# Patient Record
Sex: FEMALE | Race: WHITE | NOT HISPANIC OR LATINO | Employment: UNEMPLOYED | URBAN - METROPOLITAN AREA
[De-identification: names, ages, dates, MRNs, and addresses within clinical notes are randomized per-mention and may not be internally consistent; named-entity substitution may affect disease eponyms.]

---

## 2023-03-18 LAB
COMPLEMENT C3 (MG/DL) IN SER/PLAS: 112 MG/DL (ref 87–200)
COMPLEMENT C4 (MG/DL) IN SER/PLAS: 32 MG/DL (ref 10–50)
CREATININE (MG/DL) IN URINE: 120 MG/DL (ref 20–320)
PROTEIN (MG/DL) IN URINE: 20 MG/DL (ref 5–24)
PROTEIN/CREATININE (MG/MG) IN URINE: 0.17 MG/MG CREAT (ref 0–0.17)

## 2023-03-21 LAB
ALANINE AMINOTRANSFERASE (SGPT) (U/L) IN SER/PLAS: 16 U/L (ref 7–45)
ALBUMIN (G/DL) IN SER/PLAS: 4.3 G/DL (ref 3.4–5)
ALKALINE PHOSPHATASE (U/L) IN SER/PLAS: 67 U/L (ref 33–110)
ANION GAP IN SER/PLAS: 12 MMOL/L (ref 10–20)
ASPARTATE AMINOTRANSFERASE (SGOT) (U/L) IN SER/PLAS: 24 U/L (ref 9–39)
BASOPHILS (10*3/UL) IN BLOOD BY AUTOMATED COUNT: 0.06 X10E9/L (ref 0–0.1)
BASOPHILS/100 LEUKOCYTES IN BLOOD BY AUTOMATED COUNT: 1.1 % (ref 0–2)
BILIRUBIN TOTAL (MG/DL) IN SER/PLAS: 0.6 MG/DL (ref 0–1.2)
CALCIUM (MG/DL) IN SER/PLAS: 9.7 MG/DL (ref 8.6–10.6)
CARBON DIOXIDE, TOTAL (MMOL/L) IN SER/PLAS: 29 MMOL/L (ref 21–32)
CHLORIDE (MMOL/L) IN SER/PLAS: 104 MMOL/L (ref 98–107)
CREATININE (MG/DL) IN SER/PLAS: 0.88 MG/DL (ref 0.5–1.05)
EOSINOPHILS (10*3/UL) IN BLOOD BY AUTOMATED COUNT: 0.18 X10E9/L (ref 0–0.7)
EOSINOPHILS/100 LEUKOCYTES IN BLOOD BY AUTOMATED COUNT: 3.3 % (ref 0–6)
ERYTHROCYTE DISTRIBUTION WIDTH (RATIO) BY AUTOMATED COUNT: 12.2 % (ref 11.5–14.5)
ERYTHROCYTE MEAN CORPUSCULAR HEMOGLOBIN CONCENTRATION (G/DL) BY AUTOMATED: 31.5 G/DL (ref 32–36)
ERYTHROCYTE MEAN CORPUSCULAR VOLUME (FL) BY AUTOMATED COUNT: 99 FL (ref 80–100)
ERYTHROCYTES (10*6/UL) IN BLOOD BY AUTOMATED COUNT: 4.2 X10E12/L (ref 4–5.2)
GFR FEMALE: 77 ML/MIN/1.73M2
GLUCOSE (MG/DL) IN SER/PLAS: 91 MG/DL (ref 74–99)
HEMATOCRIT (%) IN BLOOD BY AUTOMATED COUNT: 41.6 % (ref 36–46)
HEMOGLOBIN (G/DL) IN BLOOD: 13.1 G/DL (ref 12–16)
IMMATURE GRANULOCYTES/100 LEUKOCYTES IN BLOOD BY AUTOMATED COUNT: 0.2 % (ref 0–0.9)
LEUKOCYTES (10*3/UL) IN BLOOD BY AUTOMATED COUNT: 5.4 X10E9/L (ref 4.4–11.3)
LYMPHOCYTES (10*3/UL) IN BLOOD BY AUTOMATED COUNT: 1.51 X10E9/L (ref 1.2–4.8)
LYMPHOCYTES/100 LEUKOCYTES IN BLOOD BY AUTOMATED COUNT: 27.9 % (ref 13–44)
MONOCYTES (10*3/UL) IN BLOOD BY AUTOMATED COUNT: 0.66 X10E9/L (ref 0.1–1)
MONOCYTES/100 LEUKOCYTES IN BLOOD BY AUTOMATED COUNT: 12.2 % (ref 2–10)
NEUTROPHILS (10*3/UL) IN BLOOD BY AUTOMATED COUNT: 2.99 X10E9/L (ref 1.2–7.7)
NEUTROPHILS/100 LEUKOCYTES IN BLOOD BY AUTOMATED COUNT: 55.3 % (ref 40–80)
NRBC (PER 100 WBCS) BY AUTOMATED COUNT: 0 /100 WBC (ref 0–0)
PLATELETS (10*3/UL) IN BLOOD AUTOMATED COUNT: 236 X10E9/L (ref 150–450)
POTASSIUM (MMOL/L) IN SER/PLAS: 4.8 MMOL/L (ref 3.5–5.3)
PROTEIN TOTAL: 7.1 G/DL (ref 6.4–8.2)
SODIUM (MMOL/L) IN SER/PLAS: 140 MMOL/L (ref 136–145)
THYROTROPIN (MIU/L) IN SER/PLAS BY DETECTION LIMIT <= 0.05 MIU/L: 0.56 MIU/L (ref 0.44–3.98)
UREA NITROGEN (MG/DL) IN SER/PLAS: 12 MG/DL (ref 6–23)

## 2023-06-15 LAB
CHLAMYDIA TRACH., AMPLIFIED: NEGATIVE
N. GONORRHEA, AMPLIFIED: NEGATIVE
TRICHOMONAS VAGINALIS: NEGATIVE

## 2023-08-03 LAB
APPEARANCE, URINE: ABNORMAL
BILIRUBIN, URINE: NEGATIVE
BLOOD, URINE: ABNORMAL
COLOR, URINE: YELLOW
GLUCOSE, URINE: NEGATIVE MG/DL
KETONES, URINE: NEGATIVE MG/DL
LEUKOCYTE ESTERASE, URINE: ABNORMAL
MUCUS, URINE: ABNORMAL /LPF
NITRITE, URINE: NEGATIVE
PH, URINE: 6 (ref 5–8)
PROTEIN, URINE: ABNORMAL MG/DL
RBC, URINE: 24 /HPF (ref 0–5)
SPECIFIC GRAVITY, URINE: 1.01 (ref 1–1.03)
SQUAMOUS EPITHELIAL CELLS, URINE: <1 /HPF
UROBILINOGEN, URINE: <2 MG/DL (ref 0–1.9)
WBC CLUMPS, URINE: ABNORMAL /HPF
WBC, URINE: 184 /HPF (ref 0–5)

## 2023-08-04 LAB — URINE CULTURE: NORMAL

## 2024-01-02 ENCOUNTER — TELEPHONE (OUTPATIENT)
Dept: PRIMARY CARE | Facility: CLINIC | Age: 59
End: 2024-01-02

## 2024-01-02 ENCOUNTER — OFFICE VISIT (OUTPATIENT)
Dept: PRIMARY CARE | Facility: CLINIC | Age: 59
End: 2024-01-02
Payer: COMMERCIAL

## 2024-01-02 VITALS
WEIGHT: 115 LBS | HEIGHT: 65 IN | SYSTOLIC BLOOD PRESSURE: 130 MMHG | BODY MASS INDEX: 19.16 KG/M2 | DIASTOLIC BLOOD PRESSURE: 80 MMHG | HEART RATE: 80 BPM

## 2024-01-02 DIAGNOSIS — H61.22 IMPACTED CERUMEN OF LEFT EAR: ICD-10-CM

## 2024-01-02 DIAGNOSIS — H66.90 ACUTE OTITIS MEDIA, UNSPECIFIED OTITIS MEDIA TYPE: ICD-10-CM

## 2024-01-02 DIAGNOSIS — R42 VERTIGO: Primary | ICD-10-CM

## 2024-01-02 DIAGNOSIS — H93.12 BUZZING IN EAR, LEFT: ICD-10-CM

## 2024-01-02 PROCEDURE — 99213 OFFICE O/P EST LOW 20 MIN: CPT | Performed by: INTERNAL MEDICINE

## 2024-01-02 PROCEDURE — 1036F TOBACCO NON-USER: CPT | Performed by: INTERNAL MEDICINE

## 2024-01-02 PROCEDURE — 69210 REMOVE IMPACTED EAR WAX UNI: CPT | Performed by: INTERNAL MEDICINE

## 2024-01-02 RX ORDER — AZITHROMYCIN 250 MG/1
TABLET, FILM COATED ORAL
Qty: 6 TABLET | Refills: 0 | Status: SHIPPED | OUTPATIENT
Start: 2024-01-02 | End: 2024-01-07

## 2024-01-02 RX ORDER — MECLIZINE HYDROCHLORIDE 25 MG/1
12.5 TABLET ORAL 3 TIMES DAILY PRN
Qty: 20 TABLET | Refills: 0 | Status: SHIPPED | OUTPATIENT
Start: 2024-01-02 | End: 2024-01-09

## 2024-01-02 RX ORDER — ROSUVASTATIN CALCIUM 20 MG/1
20 TABLET, COATED ORAL DAILY
COMMUNITY
End: 2024-01-12 | Stop reason: SDUPTHER

## 2024-01-02 RX ORDER — LEVOTHYROXINE SODIUM 88 UG/1
88 TABLET ORAL DAILY
COMMUNITY
End: 2024-01-12 | Stop reason: SDUPTHER

## 2024-01-02 NOTE — PROGRESS NOTES
"Subjective   Patient ID: Alesia Arias is a 58 y.o. female who presents for Vertigo.    HPI   Patient is a 58-year-old  female who is being seen today for a sick visit (I am covering for Dr. Wilson who is her regular PCP and is on medication currently)  Patient complains of 5 days history of a room spinning sensation associated with nausea and dizziness along with a buzzing sensation in left ear with decreased hearing in this ear as well.  Patient denies fever, chills, chest pain, shortness of breath, palpitations, syncope, headaches or genitourinary symptoms.  Review of Systems  As per HPI  Objective   /88 (BP Location: Right arm, Patient Position: Sitting, BP Cuff Size: Adult)   Pulse 80   Ht 1.651 m (5' 5\")   Wt 52.2 kg (115 lb)   BMI 19.14 kg/m²     Physical Exam  General - well developed, well appearing, thin, middle-aged  female in no acute respiratory distress  Eyes - normal sclera and conjunctiva with no pallor or icterus, normal extraocular movements  ENT -right external auditory canal and right tympanic membrane are normal, left external auditory canal is impacted with cerumen, after removal of the cerumen inspection of the left tympanic membrane revealed slight erythema and dullness  Neck - No JVD, thyromegaly or lymphadenopathy  Lungs - no respiratory distress and lungs clear to auscultation bilaterally  Heart - normal S1, S2 with normal heart rate, rhythm and no murmurs   Abdomen - soft, nontender with no masses or organomegaly  Extremities - no cyanosis or pedal edema  Neuro - grossly normal neuro exam with no focal neuro deficits  Psych - normal mental status, mood and affect   Skin - no rashes or ulcers  MSK - normal gait with grossly normal ROM of major joints  Assessment/Plan     1.  Vertigo-meclizine 12.5 mg 3 times daily as needed has been prescribed, patient has been warned regarding side effect of sedation and to not drive if she is drowsy due to the " medication  2.  Buzzing sensation in left ear with cerumen impaction-ear irrigation with removal of cerumen performed   3.  Left acute otitis media-Z-Chandra for 5 days prescribed  Follow-up with regular MD as advised during previous appointment.  20 minutes spent rooming the patient, reviewing records, eliciting history, examining patient, counseling, coordination of care and in documentation.  This note was partially generated using the Dragon voice recognition system. There may be some incorrect words, spelling and punctuation errors that were not corrected prior to committing the note to the patient's medical record.

## 2024-01-02 NOTE — TELEPHONE ENCOUNTER
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  [facility]. Left side ear feels like she is under water come slight congestion.  But when she stands up or turns she is very dizzy or unsteady.      She wanted to know if she could see you today in Dr. iWlson's absence?  She goes back to work tomorrow    [phone]

## 2024-01-12 DIAGNOSIS — E03.9 HYPOTHYROIDISM, UNSPECIFIED TYPE: ICD-10-CM

## 2024-01-12 DIAGNOSIS — E78.5 HYPERLIPIDEMIA, UNSPECIFIED HYPERLIPIDEMIA TYPE: Primary | ICD-10-CM

## 2024-01-12 RX ORDER — ROSUVASTATIN CALCIUM 20 MG/1
20 TABLET, COATED ORAL DAILY
Qty: 30 TABLET | Refills: 0 | Status: SHIPPED | OUTPATIENT
Start: 2024-01-12 | End: 2024-02-16 | Stop reason: SDUPTHER

## 2024-01-12 RX ORDER — LEVOTHYROXINE SODIUM 88 UG/1
88 TABLET ORAL DAILY
Qty: 60 TABLET | Refills: 0 | Status: SHIPPED | OUTPATIENT
Start: 2024-01-12 | End: 2024-03-01 | Stop reason: SDUPTHER

## 2024-01-12 NOTE — TELEPHONE ENCOUNTER
[facility]; 90 day supply.  Pt can only take brand name synthroid.  Called Pt, told her to contact [employer] and [employer] to find out if synthroid is covered if she goes to local pharm [facility] doesn't carry it.  In the meantime Pt asks for 14 day supply of synthroid to be sent to local pharm.  LG

## 2024-02-16 DIAGNOSIS — E78.5 HYPERLIPIDEMIA, UNSPECIFIED HYPERLIPIDEMIA TYPE: ICD-10-CM

## 2024-02-16 RX ORDER — ROSUVASTATIN CALCIUM 20 MG/1
20 TABLET, COATED ORAL DAILY
Qty: 30 TABLET | Refills: 0 | Status: SHIPPED | OUTPATIENT
Start: 2024-02-16 | End: 2024-03-01 | Stop reason: SDUPTHER

## 2024-02-19 ENCOUNTER — TELEPHONE (OUTPATIENT)
Dept: PRIMARY CARE | Facility: CLINIC | Age: 59
End: 2024-02-19
Payer: COMMERCIAL

## 2024-02-19 DIAGNOSIS — E78.00 HYPERCHOLESTEROLEMIA: Primary | ICD-10-CM

## 2024-02-19 DIAGNOSIS — E03.9 HYPOTHYROIDISM, UNSPECIFIED TYPE: ICD-10-CM

## 2024-02-19 DIAGNOSIS — R79.9 ABNORMAL BLOOD CHEMISTRY: ICD-10-CM

## 2024-02-19 DIAGNOSIS — Z00.00 ROUTINE GENERAL MEDICAL EXAMINATION AT A HEALTH CARE FACILITY: ICD-10-CM

## 2024-02-19 NOTE — TELEPHONE ENCOUNTER
Patient has an appointment next week and she wanted to know if you could put an order in for her lab work so she can go this week to have it done.    [phone]

## 2024-02-21 ENCOUNTER — LAB (OUTPATIENT)
Dept: LAB | Facility: LAB | Age: 59
End: 2024-02-21
Payer: COMMERCIAL

## 2024-02-21 DIAGNOSIS — E03.9 HYPOTHYROIDISM, UNSPECIFIED TYPE: ICD-10-CM

## 2024-02-21 DIAGNOSIS — E78.00 HYPERCHOLESTEROLEMIA: ICD-10-CM

## 2024-02-21 DIAGNOSIS — R79.9 ABNORMAL BLOOD CHEMISTRY: ICD-10-CM

## 2024-02-21 LAB
ALBUMIN SERPL BCP-MCNC: 4.4 G/DL (ref 3.4–5)
ALP SERPL-CCNC: 50 U/L (ref 33–110)
ALT SERPL W P-5'-P-CCNC: 14 U/L (ref 7–45)
ANION GAP SERPL CALC-SCNC: 14 MMOL/L (ref 10–20)
AST SERPL W P-5'-P-CCNC: 18 U/L (ref 9–39)
BASOPHILS # BLD AUTO: 0.06 X10*3/UL (ref 0–0.1)
BASOPHILS NFR BLD AUTO: 1 %
BILIRUB SERPL-MCNC: 0.6 MG/DL (ref 0–1.2)
BUN SERPL-MCNC: 11 MG/DL (ref 6–23)
CALCIUM SERPL-MCNC: 9.3 MG/DL (ref 8.6–10.6)
CHLORIDE SERPL-SCNC: 107 MMOL/L (ref 98–107)
CHOLEST SERPL-MCNC: 205 MG/DL (ref 0–199)
CHOLESTEROL/HDL RATIO: 2.5
CO2 SERPL-SCNC: 25 MMOL/L (ref 21–32)
CREAT SERPL-MCNC: 1.01 MG/DL (ref 0.5–1.05)
EGFRCR SERPLBLD CKD-EPI 2021: 65 ML/MIN/1.73M*2
EOSINOPHIL # BLD AUTO: 0.12 X10*3/UL (ref 0–0.7)
EOSINOPHIL NFR BLD AUTO: 2.1 %
ERYTHROCYTE [DISTWIDTH] IN BLOOD BY AUTOMATED COUNT: 12 % (ref 11.5–14.5)
GLUCOSE SERPL-MCNC: 82 MG/DL (ref 74–99)
HCT VFR BLD AUTO: 39.9 % (ref 36–46)
HDLC SERPL-MCNC: 81.5 MG/DL
HGB BLD-MCNC: 13.1 G/DL (ref 12–16)
IMM GRANULOCYTES # BLD AUTO: 0.01 X10*3/UL (ref 0–0.7)
IMM GRANULOCYTES NFR BLD AUTO: 0.2 % (ref 0–0.9)
LDLC SERPL CALC-MCNC: 107 MG/DL
LYMPHOCYTES # BLD AUTO: 1.74 X10*3/UL (ref 1.2–4.8)
LYMPHOCYTES NFR BLD AUTO: 30.4 %
MCH RBC QN AUTO: 31.6 PG (ref 26–34)
MCHC RBC AUTO-ENTMCNC: 32.8 G/DL (ref 32–36)
MCV RBC AUTO: 96 FL (ref 80–100)
MONOCYTES # BLD AUTO: 0.63 X10*3/UL (ref 0.1–1)
MONOCYTES NFR BLD AUTO: 11 %
NEUTROPHILS # BLD AUTO: 3.17 X10*3/UL (ref 1.2–7.7)
NEUTROPHILS NFR BLD AUTO: 55.3 %
NON HDL CHOLESTEROL: 124 MG/DL (ref 0–149)
NRBC BLD-RTO: 0 /100 WBCS (ref 0–0)
PLATELET # BLD AUTO: 239 X10*3/UL (ref 150–450)
POTASSIUM SERPL-SCNC: 4.2 MMOL/L (ref 3.5–5.3)
PROT SERPL-MCNC: 7.1 G/DL (ref 6.4–8.2)
RBC # BLD AUTO: 4.14 X10*6/UL (ref 4–5.2)
SODIUM SERPL-SCNC: 142 MMOL/L (ref 136–145)
TRIGL SERPL-MCNC: 83 MG/DL (ref 0–149)
TSH SERPL-ACNC: 3.03 MIU/L (ref 0.44–3.98)
VLDL: 17 MG/DL (ref 0–40)
WBC # BLD AUTO: 5.7 X10*3/UL (ref 4.4–11.3)

## 2024-02-21 PROCEDURE — 85025 COMPLETE CBC W/AUTO DIFF WBC: CPT

## 2024-02-21 PROCEDURE — 80061 LIPID PANEL: CPT

## 2024-02-21 PROCEDURE — 80053 COMPREHEN METABOLIC PANEL: CPT

## 2024-02-21 PROCEDURE — 84443 ASSAY THYROID STIM HORMONE: CPT

## 2024-02-21 PROCEDURE — 36415 COLL VENOUS BLD VENIPUNCTURE: CPT

## 2024-02-27 ENCOUNTER — APPOINTMENT (OUTPATIENT)
Dept: PRIMARY CARE | Facility: CLINIC | Age: 59
End: 2024-02-27
Payer: COMMERCIAL

## 2024-03-01 ENCOUNTER — OFFICE VISIT (OUTPATIENT)
Dept: PRIMARY CARE | Facility: CLINIC | Age: 59
End: 2024-03-01
Payer: COMMERCIAL

## 2024-03-01 VITALS — WEIGHT: 116 LBS | SYSTOLIC BLOOD PRESSURE: 120 MMHG | DIASTOLIC BLOOD PRESSURE: 76 MMHG | BODY MASS INDEX: 19.3 KG/M2

## 2024-03-01 DIAGNOSIS — E78.00 HYPERCHOLESTEROLEMIA: ICD-10-CM

## 2024-03-01 DIAGNOSIS — R42 VERTIGO: ICD-10-CM

## 2024-03-01 DIAGNOSIS — E03.9 HYPOTHYROIDISM, UNSPECIFIED TYPE: ICD-10-CM

## 2024-03-01 DIAGNOSIS — E78.5 HYPERLIPIDEMIA, UNSPECIFIED HYPERLIPIDEMIA TYPE: ICD-10-CM

## 2024-03-01 DIAGNOSIS — Z12.31 SCREENING MAMMOGRAM FOR BREAST CANCER: Primary | ICD-10-CM

## 2024-03-01 PROCEDURE — 99214 OFFICE O/P EST MOD 30 MIN: CPT | Performed by: INTERNAL MEDICINE

## 2024-03-01 PROCEDURE — 1036F TOBACCO NON-USER: CPT | Performed by: INTERNAL MEDICINE

## 2024-03-01 RX ORDER — LEVOTHYROXINE SODIUM 88 UG/1
88 TABLET ORAL DAILY
Qty: 60 TABLET | Refills: 0 | Status: SHIPPED | OUTPATIENT
Start: 2024-03-01 | End: 2024-06-03 | Stop reason: SDUPTHER

## 2024-03-01 RX ORDER — ROSUVASTATIN CALCIUM 20 MG/1
20 TABLET, COATED ORAL DAILY
Qty: 90 TABLET | Refills: 2 | Status: SHIPPED | OUTPATIENT
Start: 2024-03-01 | End: 2024-11-26

## 2024-03-01 ASSESSMENT — ENCOUNTER SYMPTOMS
LOSS OF SENSATION IN FEET: 0
DEPRESSION: 0
OCCASIONAL FEELINGS OF UNSTEADINESS: 0

## 2024-03-01 ASSESSMENT — PATIENT HEALTH QUESTIONNAIRE - PHQ9
SUM OF ALL RESPONSES TO PHQ9 QUESTIONS 1 AND 2: 0
1. LITTLE INTEREST OR PLEASURE IN DOING THINGS: NOT AT ALL
2. FEELING DOWN, DEPRESSED OR HOPELESS: NOT AT ALL

## 2024-03-01 ASSESSMENT — COLUMBIA-SUICIDE SEVERITY RATING SCALE - C-SSRS
1. IN THE PAST MONTH, HAVE YOU WISHED YOU WERE DEAD OR WISHED YOU COULD GO TO SLEEP AND NOT WAKE UP?: NO
6. HAVE YOU EVER DONE ANYTHING, STARTED TO DO ANYTHING, OR PREPARED TO DO ANYTHING TO END YOUR LIFE?: NO
2. HAVE YOU ACTUALLY HAD ANY THOUGHTS OF KILLING YOURSELF?: NO

## 2024-03-01 NOTE — PROGRESS NOTES
Subjective   Patient ID: Alesia Arias is a 58 y.o. female who presents for FUV.    HPI  Patient in for a visit  She has not been exercising as much just joined tennis group for once a week  The vertigo has resolved , she has  baseline motion sickness  Family all has had covid she is waiting for four months to pass and will get the covid booster then    Review of Systems  General: Denies fever, chills, night sweats,  Eyes: Negative for recent visual changes  Ears, Nose, Throat :  Negative for hearing changes, sinus discomfort  Dermatologic: Negative for new skin conditions, rash  Respiratory: Negative for wheezing, shortness of breath, cough  Cardiovascular: Negative for chest pain, palpitations, or leg swelling  Gastrointestinal: Negative for nausea/vomiting, abdominal pain, changes in bowel habits  Genitourinary NEGATIVE FOR URINARY INCONTINENCE urgency , frequency, discomfort   Musculoskeletal: see hpi  Neurological: Negative for headaches, dizziness    Previous history  Past Medical History:   Diagnosis Date    Abnormal finding of blood chemistry, unspecified 11/03/2021    Abnormal blood chemistry    Anxiety disorder, unspecified 11/09/2018    Anxiety disorder    Asymptomatic varicose veins of unspecified lower extremity 06/16/2021    Varicose veins    Chronic rhinitis 04/01/2019    Rhinitis    Diarrhea, unspecified 03/15/2022    Diarrhea    Disorder of thyroid, unspecified     Thyroid trouble    Dizziness and giddiness 05/09/2019    Dizziness    Dysphonia 08/28/2018    Hoarseness    Edema, unspecified 06/16/2021    Edema    Elevated lipoprotein(a) 02/17/2022    Elevated lipoprotein A level    Encounter for general adult medical examination without abnormal findings 12/01/2020    Encounter for preventive health examination    Encounter for gynecological examination (general) (routine) without abnormal findings 11/07/2020    Visit for gynecologic examination    Encounter for immunization 11/02/2021    Flu  vaccine need    Encounter for screening for malignant neoplasm of colon 06/16/2021    Colon cancer screening    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    Hyperesthesia 11/07/2017    Hyperesthesia    Noninfective gastroenteritis and colitis, unspecified 04/01/2019    Colitis    Nontoxic single thyroid nodule 06/16/2021    Thyroid nodule    Other abnormality of red blood cells 11/03/2021    Elevated MCV    Other chest pain 11/02/2021    Chest discomfort    Other conditions influencing health status 07/01/2013    Lump Or Swelling In The Neck    Other intra-abdominal and pelvic swelling, mass and lump 11/07/2017    Inguinal bulge    Pain in right leg 06/16/2021    Pain in both lower extremities    Palpitations 11/02/2021    Palpitations    Postnasal drip 08/28/2018    Post-nasal drip    Rash and other nonspecific skin eruption 11/07/2017    Rash    Unspecified abdominal pain 10/25/2016    Abdominal pain    Unspecified symptoms and signs involving the genitourinary system 08/10/2022    Urinary symptom or sign    Zoster without complications 11/07/2017    Shingles     Past Surgical History:   Procedure Laterality Date    OTHER SURGICAL HISTORY  05/28/2019    No history of surgery     Social History     Tobacco Use    Smoking status: Never    Smokeless tobacco: Never   Vaping Use    Vaping Use: Never used   Substance Use Topics    Alcohol use: Not Currently    Drug use: Never     No family history on file.  No Known Allergies  Current Outpatient Medications   Medication Instructions    B complex-vitamin C-folic acid (Nephro[facility]) 1- mg-mg-mcg tablet 1 tablet, oral, Daily with breakfast    rosuvastatin (CRESTOR) 20 mg, oral, Daily    Synthroid 88 mcg, oral, Daily       Objective       Physical Exam    Vital Signs: as recorded above  General: Well groomed, well nourished   Orientation:  Alert , oriented to time, place , and person   Mood and Affect:  Cooperative , no apparent distress normal affect  Skin: Good  color, good turgor no lesions on right abdomen and midax , post area   Eyes: Extra ocular muscle movements intact, anicteric sclerae  Neck: Supple, full range of movement  Chest: Normal breath sounds, normal chest wall exam, symmetric, good air entry, clear to auscultation  Heart: Regular rate and rhythm, without murmur, gallop, or rubs  BACK:  no CTLS spine tenderness, no flank tenderness  Extremities: full range of movement  bilateral UE and bilateral LE,  no lower extremity edema  Neurological: Alert, oriented, cranial nerves II-XII grossly intact except for visual acuity  Sensation:  Intact   Gait: normal steady    Assessment/Plan   Alesia Arias is a 58 y.o. female who presents for the concerns below:    Problem List Items Addressed This Visit    None    HYPERCHOLESTEROLEMIA PLAN: stable  continue current medications  Follow low cholesterol diet, encouraged high omega 3 fatty acid intake in diet, exercise, weight control. . Will Obtain AST/ALT, fasting lipid profile, creatine kinase  on statin if not done within past 3-6 months . Coenzyme Q10 200 mg a day if able to help increase HDL.    HYPOTHYROIDISM PLAN:  Patient is clinically stable does not have any changes in bowel habits, skin, blood pressure, heart rates, weight, and mood, will be due for TSH check.  Refills as needed.   Reiterated that patient takes medication on an empty stomach.         Discussed with:   Return in :    Portions of this note were generated using digital voice recognition software, and may contain grammatical errors       Adam Romo MD  03/01/24  10:13 AM

## 2024-06-03 DIAGNOSIS — E03.9 HYPOTHYROIDISM, UNSPECIFIED TYPE: ICD-10-CM

## 2024-06-03 RX ORDER — LEVOTHYROXINE SODIUM 88 UG/1
88 TABLET ORAL DAILY
Qty: 90 TABLET | Refills: 0 | Status: SHIPPED | OUTPATIENT
Start: 2024-06-03

## 2024-06-21 ENCOUNTER — APPOINTMENT (OUTPATIENT)
Dept: OBSTETRICS AND GYNECOLOGY | Facility: CLINIC | Age: 59
End: 2024-06-21
Payer: COMMERCIAL

## 2024-06-24 DIAGNOSIS — E78.5 HYPERLIPIDEMIA, UNSPECIFIED HYPERLIPIDEMIA TYPE: ICD-10-CM

## 2024-07-08 RX ORDER — ROSUVASTATIN CALCIUM 20 MG/1
20 TABLET, COATED ORAL DAILY
Qty: 90 TABLET | Refills: 2 | Status: SHIPPED | OUTPATIENT
Start: 2024-07-08 | End: 2025-04-04

## 2024-08-19 ENCOUNTER — TELEMEDICINE (OUTPATIENT)
Dept: PRIMARY CARE | Facility: CLINIC | Age: 59
End: 2024-08-19
Payer: COMMERCIAL

## 2024-08-19 DIAGNOSIS — H10.32 ACUTE CONJUNCTIVITIS OF LEFT EYE, UNSPECIFIED ACUTE CONJUNCTIVITIS TYPE: Primary | ICD-10-CM

## 2024-08-19 PROCEDURE — 99213 OFFICE O/P EST LOW 20 MIN: CPT | Performed by: INTERNAL MEDICINE

## 2024-08-19 RX ORDER — NEOMYCIN SULFATE, POLYMYXIN B SULFATE AND DEXAMETHASONE 3.5; 10000; 1 MG/ML; [USP'U]/ML; MG/ML
1 SUSPENSION/ DROPS OPHTHALMIC 4 TIMES DAILY
Qty: 5 ML | Refills: 0 | Status: SHIPPED | OUTPATIENT
Start: 2024-08-19 | End: 2024-08-26

## 2024-08-19 NOTE — PROGRESS NOTES
Subjective   Patient ID: Alesia Arias is a 59 y.o. female who presents for No chief complaint on file..    HPI    I performed this visit using real-time telehealth tools, including an audio/video connection between Alesia Arias and myself Dr Wilson in office [facility], [place], [place]  Patient on with me on a virtual visit  Thursday was out of town, used heat compress , glued shut , warm compress to help with gunk , left eye but now right eye may be starting   She does use eye make up mascara and eye liner   She is in the office today tomorrow and Wednesday no contact lens   Review of Systems  General: Denies fever, chills, night sweats,  Eyes: Negative for recent visual changes  Ears, Nose, Throat :  Negative for hearing changes, sinus discomfort  Dermatologic: Negative for new skin conditions, rash  Respiratory: Negative for wheezing, shortness of breath, cough  Neurological: Negative for headaches, dizziness   Previous history  Past Medical History:   Diagnosis Date    Abnormal finding of blood chemistry, unspecified 11/03/2021    Abnormal blood chemistry    Anxiety disorder, unspecified 11/09/2018    Anxiety disorder    Asymptomatic varicose veins of unspecified lower extremity 06/16/2021    Varicose veins    Chronic rhinitis 04/01/2019    Rhinitis    Diarrhea, unspecified 03/15/2022    Diarrhea    Disorder of thyroid, unspecified     Thyroid trouble    Dizziness and giddiness 05/09/2019    Dizziness    Dysphonia 08/28/2018    Hoarseness    Edema, unspecified 06/16/2021    Edema    Elevated lipoprotein(a) 02/17/2022    Elevated lipoprotein A level    Encounter for general adult medical examination without abnormal findings 12/01/2020    Encounter for preventive health examination    Encounter for gynecological examination (general) (routine) without abnormal findings 11/07/2020    Visit for gynecologic examination    Encounter for immunization 11/02/2021    Flu vaccine need    Encounter  for screening for malignant neoplasm of colon 06/16/2021    Colon cancer screening    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    Hyperesthesia 11/07/2017    Hyperesthesia    Noninfective gastroenteritis and colitis, unspecified 04/01/2019    Colitis    Nontoxic single thyroid nodule 06/16/2021    Thyroid nodule    Other abnormality of red blood cells 11/03/2021    Elevated MCV    Other chest pain 11/02/2021    Chest discomfort    Other conditions influencing health status 07/01/2013    Lump Or Swelling In The Neck    Other intra-abdominal and pelvic swelling, mass and lump 11/07/2017    Inguinal bulge    Pain in right leg 06/16/2021    Pain in both lower extremities    Palpitations 11/02/2021    Palpitations    Postnasal drip 08/28/2018    Post-nasal drip    Rash and other nonspecific skin eruption 11/07/2017    Rash    Unspecified abdominal pain 10/25/2016    Abdominal pain    Unspecified symptoms and signs involving the genitourinary system 08/10/2022    Urinary symptom or sign    Zoster without complications 11/07/2017    Shingles     Past Surgical History:   Procedure Laterality Date    OTHER SURGICAL HISTORY  05/28/2019    No history of surgery     Social History     Tobacco Use    Smoking status: Never    Smokeless tobacco: Never   Vaping Use    Vaping status: Never Used   Substance Use Topics    Alcohol use: Not Currently    Drug use: Never     No family history on file.  No Known Allergies  Current Outpatient Medications   Medication Instructions    B complex-vitamin C-folic acid (Nephro-Josselyn Rx) 1- mg-mg-mcg tablet 1 tablet, oral, Daily with breakfast    rosuvastatin (CRESTOR) 20 mg, oral, Daily    Synthroid 88 mcg, oral, Daily       Objective       Physical Exam    via [facility]  General: Well groomed, well nourished , speaks full sentences  Alert Cooperative , no apparent distress   Skin: Good color does not appear dehydrated   Eyes: Extra ocular muscle movements intact, anicteric  sclerae  Neck: Supple, with good range of motion looking behind her and moving head sideways to cough   Neurological: Alert, oriented      Assessment/Plan   Alesia Arias is a 59 y.o. female who presents for the concerns below:    Problem List Items Addressed This Visit    None  CONJUNCTIVITIS PLAN:  Avoid manipulation , no scratching  Patient advised to refrain from using contact lens if applicable   Discontinue any eye makeup if any, and discard mascara, foam applicators, sterilize makeup brushes   Will start antibiotic with steroid eyedrops unless with allergies.    See ophthalmology if worse or persistent.  If blurred vision/loss of vision develops seek emergent care  Time Spent  with patient:  5  minutes of which greater than 50 percent was spent counselling and coordinating care.           Discussed with:   Return in :    Portions of this note were generated using digital voice recognition software, and may contain grammatical errors       Adam Romo MD  08/19/24  10:53 AM

## 2024-08-26 DIAGNOSIS — E03.9 HYPOTHYROIDISM, UNSPECIFIED TYPE: ICD-10-CM

## 2024-08-26 RX ORDER — LEVOTHYROXINE SODIUM 88 UG/1
88 TABLET ORAL DAILY
Qty: 90 TABLET | Refills: 0 | Status: SHIPPED | OUTPATIENT
Start: 2024-08-26

## 2024-09-20 ENCOUNTER — APPOINTMENT (OUTPATIENT)
Dept: OBSTETRICS AND GYNECOLOGY | Facility: CLINIC | Age: 59
End: 2024-09-20
Payer: COMMERCIAL

## 2024-10-04 DIAGNOSIS — E78.5 HYPERLIPIDEMIA, UNSPECIFIED HYPERLIPIDEMIA TYPE: ICD-10-CM

## 2024-10-04 RX ORDER — ROSUVASTATIN CALCIUM 20 MG/1
20 TABLET, COATED ORAL DAILY
Qty: 90 TABLET | Refills: 0 | Status: SHIPPED | OUTPATIENT
Start: 2024-10-04 | End: 2025-01-02

## 2024-12-16 ENCOUNTER — APPOINTMENT (OUTPATIENT)
Dept: PRIMARY CARE | Facility: CLINIC | Age: 59
End: 2024-12-16
Payer: COMMERCIAL

## 2024-12-16 VITALS
BODY MASS INDEX: 18.44 KG/M2 | DIASTOLIC BLOOD PRESSURE: 80 MMHG | WEIGHT: 108 LBS | HEIGHT: 64 IN | HEART RATE: 79 BPM | SYSTOLIC BLOOD PRESSURE: 125 MMHG | OXYGEN SATURATION: 97 %

## 2024-12-16 DIAGNOSIS — R19.5 LOOSE STOOLS: ICD-10-CM

## 2024-12-16 DIAGNOSIS — E78.5 HYPERLIPIDEMIA, UNSPECIFIED HYPERLIPIDEMIA TYPE: ICD-10-CM

## 2024-12-16 DIAGNOSIS — R42 VERTIGO: ICD-10-CM

## 2024-12-16 DIAGNOSIS — Z00.00 HEALTHCARE MAINTENANCE: Primary | ICD-10-CM

## 2024-12-16 DIAGNOSIS — E55.9 VITAMIN D DEFICIENCY: ICD-10-CM

## 2024-12-16 PROCEDURE — 3008F BODY MASS INDEX DOCD: CPT | Performed by: INTERNAL MEDICINE

## 2024-12-16 PROCEDURE — 99396 PREV VISIT EST AGE 40-64: CPT | Performed by: INTERNAL MEDICINE

## 2024-12-16 PROCEDURE — 1036F TOBACCO NON-USER: CPT | Performed by: INTERNAL MEDICINE

## 2024-12-16 RX ORDER — ROSUVASTATIN CALCIUM 20 MG/1
20 TABLET, COATED ORAL DAILY
Qty: 90 TABLET | Refills: 0 | Status: SHIPPED | OUTPATIENT
Start: 2024-12-16

## 2024-12-16 ASSESSMENT — PATIENT HEALTH QUESTIONNAIRE - PHQ9
2. FEELING DOWN, DEPRESSED OR HOPELESS: NOT AT ALL
SUM OF ALL RESPONSES TO PHQ9 QUESTIONS 1 AND 2: 0
1. LITTLE INTEREST OR PLEASURE IN DOING THINGS: NOT AT ALL

## 2024-12-16 ASSESSMENT — COLUMBIA-SUICIDE SEVERITY RATING SCALE - C-SSRS
2. HAVE YOU ACTUALLY HAD ANY THOUGHTS OF KILLING YOURSELF?: NO
1. IN THE PAST MONTH, HAVE YOU WISHED YOU WERE DEAD OR WISHED YOU COULD GO TO SLEEP AND NOT WAKE UP?: NO
6. HAVE YOU EVER DONE ANYTHING, STARTED TO DO ANYTHING, OR PREPARED TO DO ANYTHING TO END YOUR LIFE?: NO

## 2024-12-16 ASSESSMENT — ENCOUNTER SYMPTOMS
OCCASIONAL FEELINGS OF UNSTEADINESS: 0
DEPRESSION: 0
LOSS OF SENSATION IN FEET: 0

## 2024-12-16 NOTE — PROGRESS NOTES
Subjective   Patient ID: Alesia Arias is a 59 y.o. female who presents for Annual Exam.    HPI  Patient in for a visit\  Takiing her statin CACS i8n 2020 51.6 RCA   Walks her dogs a lot , hair thinning though  Left side gron feels like it is swollen and it hurts to touch feels like a change  Has not had a normal bowel loose , she eats and right after will have , colonoscopy   Patient comes in for a physical exam last one done over a year ago , doing well over-all with no particular complaints. Also is in for laboratory review and health maintenance update.  Updating family history as well.  Interval event - past medical history, surgical, social, and family history reviewed and updated.  Interval care -  Patient is    up to date with dental care.  Patient does     receive routine vision care.    Review of Systems  General: Denies fever, chills, night sweats, changes in appetite or weight  ENT: Negative for ear pain, hearing loss, headache, difficulty swallowing, up to date with dental checks   Eyes: Negative for recent visual changes, up to date with eye exams in August rx glasses for distance driving , cheaters weakest   Dermatologic: Negative for new skin conditions, rash  Respiratory: Negative for paroxysmal nocturnal dyspnea, wheezing,shortness of breath, cough  Cardiovascular: Negative for chest pain, palpitations, or leg swelling  Gastrointestinal: Negative for nausea/vomiting, abdominal pain, changes in bowel habits  Genitourinary: Negative for dysuria, urgency, frequency  URINARY INCONTINENCE   Neurological: Negative for headaches, tremors, dizziness, memory loss, confusion, weakness, paresthesias  Psychiatric: Negative for sleep problems, anxiety, depression, conditions are stable  Endocrine: Negative for heat or cold intolerance, polyuria, polydipsia  Other:All systems have been reviewed and are negative except as previously noted.    Previous history  Past Medical History:   Diagnosis Date    Abnormal  finding of blood chemistry, unspecified 11/03/2021    Abnormal blood chemistry    Anxiety disorder, unspecified 11/09/2018    Anxiety disorder    Asymptomatic varicose veins of unspecified lower extremity 06/16/2021    Varicose veins    Chronic rhinitis 04/01/2019    Rhinitis    Diarrhea, unspecified 03/15/2022    Diarrhea    Disorder of thyroid, unspecified     Thyroid trouble    Dizziness and giddiness 05/09/2019    Dizziness    Dysphonia 08/28/2018    Hoarseness    Edema, unspecified 06/16/2021    Edema    Elevated lipoprotein(a) 02/17/2022    Elevated lipoprotein A level    Encounter for general adult medical examination without abnormal findings 12/01/2020    Encounter for preventive health examination    Encounter for gynecological examination (general) (routine) without abnormal findings 11/07/2020    Visit for gynecologic examination    Encounter for immunization 11/02/2021    Flu vaccine need    Encounter for screening for malignant neoplasm of colon 06/16/2021    Colon cancer screening    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    Hyperesthesia 11/07/2017    Hyperesthesia    Noninfective gastroenteritis and colitis, unspecified 04/01/2019    Colitis    Nontoxic single thyroid nodule 06/16/2021    Thyroid nodule    Other abnormality of red blood cells 11/03/2021    Elevated MCV    Other chest pain 11/02/2021    Chest discomfort    Other conditions influencing health status 07/01/2013    Lump Or Swelling In The Neck    Other intra-abdominal and pelvic swelling, mass and lump 11/07/2017    Inguinal bulge    Pain in right leg 06/16/2021    Pain in both lower extremities    Palpitations 11/02/2021    Palpitations    Postnasal drip 08/28/2018    Post-nasal drip    Rash and other nonspecific skin eruption 11/07/2017    Rash    Unspecified abdominal pain 10/25/2016    Abdominal pain    Unspecified symptoms and signs involving the genitourinary system 08/10/2022    Urinary symptom or sign    Zoster without  complications 11/07/2017    Shingles     Past Surgical History:   Procedure Laterality Date    OTHER SURGICAL HISTORY  05/28/2019    No history of surgery     Social History     Tobacco Use    Smoking status: Never    Smokeless tobacco: Never   Vaping Use    Vaping status: Never Used   Substance Use Topics    Alcohol use: Not Currently    Drug use: Never     No family history on file.  No Known Allergies  Current Outpatient Medications   Medication Instructions    B complex-vitamin C-folic acid (Nephro-Josselyn Rx) 1- mg-mg-mcg tablet 1 tablet, Daily with breakfast    rosuvastatin (CRESTOR) 20 mg, oral, Daily, Office visit due March 4, 2025    Synthroid 88 mcg, oral, Daily       Objective       Physical Exam  Vital Signs: as recorded above  General: Well groomed, well nourished   Orientation:  Alert , oriented to time, place , and person   Mood and Affect:  Cooperative , no apparent distress normal affect  Skin: Good color, good turgor  Eyes: Extra ocular muscle movements intact, anicteric sclerae  Neck: Supple, full range of movement  Chest: Normal breath sounds, normal chest wall exam, symmetric, good air entry, clear to auscultation  Heart: Regular rate and rhythm, without murmur, gallop, or rubs  Abdomen soft nontender no masses felt no hepatosplenomegaly, no rebound or guarding no lesions left inguinal region   BACK:  no CTLS spine tenderness, no flank tenderness  Extremities: full range of movement  bilateral UE and bilateral LE,  no lower extremity edema  Neurological: Alert, oriented, cranial nerves II-XII intact except for visual acuity  Sensation:  Intact   Gait: normal steady      Assessment/Plan   Alesia Arias is a 59 y.o. female who presents for the concerns below:    Problem List Items Addressed This Visit    None    Loose stools , has tried not having gluten no difference   Eats a lot of soups and salads , sxs worse with cooked onions   Milk  dairy not an issue ice cream she has tried taking it  away    See gi , cscope clear 2022     Intermittent vertigo checking tsh, cbc cmp0    ASSESSMENT AND PLAN: Patient on examination is in good health , concerns above, screening blood tests to screen for high cholesterol, diabetes, thyroid,  Patient should be taking enough calcium in a balanced diet or supplements to total 1200 mg a day in divided doses unless with history of specific types of kidney stones. Vitamin D 800-1000 iu a day, check levels since  last lab work done showed slightly low levels.  . For Female Patients Only:PAP test yearly as per gynecology   Preventive Medicine: colon cancer screening by age 45 if no family history, balanced diet, and exercise as discussed. Seat belt use for injury prevention  Substance use and /or tobacco use not applicable / counseled when applicable. Immunizations TD  up to date.  Yearly flu vaccine unless contraindicated . More than 50% of office visit time spent counseling the patient, questions were answered. Complete physical examination in a year. Patient knows either has access to  [facility] to see results and messages regarding these or will call us if cannot see them      Discussed with:   Return in :  4 months     Portions of this note were generated using digital voice recognition software, and may contain grammatical errors       Adam Romo MD  12/16/24  12:26 PM

## 2024-12-17 ENCOUNTER — LAB (OUTPATIENT)
Dept: LAB | Facility: LAB | Age: 59
End: 2024-12-17
Payer: COMMERCIAL

## 2024-12-17 DIAGNOSIS — R19.5 LOOSE STOOLS: ICD-10-CM

## 2024-12-17 DIAGNOSIS — Z00.00 HEALTHCARE MAINTENANCE: ICD-10-CM

## 2024-12-17 DIAGNOSIS — E55.9 VITAMIN D DEFICIENCY: ICD-10-CM

## 2024-12-17 LAB
25(OH)D3 SERPL-MCNC: 24 NG/ML (ref 30–100)
ALBUMIN SERPL BCP-MCNC: 4.2 G/DL (ref 3.4–5)
ALP SERPL-CCNC: 45 U/L (ref 33–110)
ALT SERPL W P-5'-P-CCNC: 9 U/L (ref 7–45)
ANION GAP SERPL CALC-SCNC: 10 MMOL/L (ref 10–20)
APPEARANCE UR: CLEAR
AST SERPL W P-5'-P-CCNC: 18 U/L (ref 9–39)
BASOPHILS # BLD AUTO: 0.06 X10*3/UL (ref 0–0.1)
BASOPHILS NFR BLD AUTO: 1.3 %
BILIRUB SERPL-MCNC: 0.7 MG/DL (ref 0–1.2)
BILIRUB UR STRIP.AUTO-MCNC: NEGATIVE MG/DL
BUN SERPL-MCNC: 12 MG/DL (ref 6–23)
CALCIUM SERPL-MCNC: 9 MG/DL (ref 8.6–10.6)
CHLORIDE SERPL-SCNC: 108 MMOL/L (ref 98–107)
CHOLEST SERPL-MCNC: 191 MG/DL (ref 0–199)
CHOLESTEROL/HDL RATIO: 2.5
CO2 SERPL-SCNC: 28 MMOL/L (ref 21–32)
COLOR UR: YELLOW
CREAT SERPL-MCNC: 0.83 MG/DL (ref 0.5–1.05)
EGFRCR SERPLBLD CKD-EPI 2021: 82 ML/MIN/1.73M*2
EOSINOPHIL # BLD AUTO: 0.15 X10*3/UL (ref 0–0.7)
EOSINOPHIL NFR BLD AUTO: 3.2 %
ERYTHROCYTE [DISTWIDTH] IN BLOOD BY AUTOMATED COUNT: 11.9 % (ref 11.5–14.5)
ERYTHROCYTE [SEDIMENTATION RATE] IN BLOOD BY WESTERGREN METHOD: 11 MM/H (ref 0–30)
GLUCOSE SERPL-MCNC: 81 MG/DL (ref 74–99)
GLUCOSE UR STRIP.AUTO-MCNC: NORMAL MG/DL
HCT VFR BLD AUTO: 38.1 % (ref 36–46)
HDLC SERPL-MCNC: 75 MG/DL
HGB BLD-MCNC: 12.9 G/DL (ref 12–16)
IMM GRANULOCYTES # BLD AUTO: 0.01 X10*3/UL (ref 0–0.7)
IMM GRANULOCYTES NFR BLD AUTO: 0.2 % (ref 0–0.9)
KETONES UR STRIP.AUTO-MCNC: NEGATIVE MG/DL
LDLC SERPL CALC-MCNC: 99 MG/DL
LEUKOCYTE ESTERASE UR QL STRIP.AUTO: NEGATIVE
LYMPHOCYTES # BLD AUTO: 1.47 X10*3/UL (ref 1.2–4.8)
LYMPHOCYTES NFR BLD AUTO: 31.3 %
MCH RBC QN AUTO: 32.9 PG (ref 26–34)
MCHC RBC AUTO-ENTMCNC: 33.9 G/DL (ref 32–36)
MCV RBC AUTO: 97 FL (ref 80–100)
MONOCYTES # BLD AUTO: 0.62 X10*3/UL (ref 0.1–1)
MONOCYTES NFR BLD AUTO: 13.2 %
NEUTROPHILS # BLD AUTO: 2.38 X10*3/UL (ref 1.2–7.7)
NEUTROPHILS NFR BLD AUTO: 50.8 %
NITRITE UR QL STRIP.AUTO: NEGATIVE
NON HDL CHOLESTEROL: 116 MG/DL (ref 0–149)
NRBC BLD-RTO: 0 /100 WBCS (ref 0–0)
PH UR STRIP.AUTO: 6 [PH]
PLATELET # BLD AUTO: 227 X10*3/UL (ref 150–450)
POTASSIUM SERPL-SCNC: 4.4 MMOL/L (ref 3.5–5.3)
PROT SERPL-MCNC: 7 G/DL (ref 6.4–8.2)
PROT UR STRIP.AUTO-MCNC: NEGATIVE MG/DL
RBC # BLD AUTO: 3.92 X10*6/UL (ref 4–5.2)
RBC # UR STRIP.AUTO: NEGATIVE /UL
SODIUM SERPL-SCNC: 142 MMOL/L (ref 136–145)
SP GR UR STRIP.AUTO: 1.02
TRIGL SERPL-MCNC: 87 MG/DL (ref 0–149)
TSH SERPL-ACNC: 3.85 MIU/L (ref 0.44–3.98)
UROBILINOGEN UR STRIP.AUTO-MCNC: NORMAL MG/DL
VLDL: 17 MG/DL (ref 0–40)
WBC # BLD AUTO: 4.7 X10*3/UL (ref 4.4–11.3)

## 2024-12-17 PROCEDURE — 85652 RBC SED RATE AUTOMATED: CPT

## 2024-12-17 PROCEDURE — 82306 VITAMIN D 25 HYDROXY: CPT

## 2024-12-17 PROCEDURE — 80061 LIPID PANEL: CPT

## 2024-12-17 PROCEDURE — 85025 COMPLETE CBC W/AUTO DIFF WBC: CPT

## 2024-12-17 PROCEDURE — 36415 COLL VENOUS BLD VENIPUNCTURE: CPT

## 2024-12-17 PROCEDURE — 81003 URINALYSIS AUTO W/O SCOPE: CPT

## 2024-12-17 PROCEDURE — 80053 COMPREHEN METABOLIC PANEL: CPT

## 2024-12-17 PROCEDURE — 84443 ASSAY THYROID STIM HORMONE: CPT

## 2024-12-19 DIAGNOSIS — E55.9 VITAMIN D DEFICIENCY: Primary | ICD-10-CM

## 2024-12-19 DIAGNOSIS — E03.9 HYPOTHYROIDISM, UNSPECIFIED TYPE: ICD-10-CM

## 2024-12-19 RX ORDER — CHOLECALCIFEROL (VITAMIN D3) 1250 MCG
50000 TABLET ORAL
Qty: 12 TABLET | Refills: 0 | Status: SHIPPED | OUTPATIENT
Start: 2024-12-19 | End: 2025-03-13

## 2024-12-19 RX ORDER — LEVOTHYROXINE SODIUM 88 UG/1
88 TABLET ORAL DAILY
Qty: 90 TABLET | Refills: 0 | Status: SHIPPED | OUTPATIENT
Start: 2024-12-19 | End: 2024-12-19 | Stop reason: SDUPTHER

## 2024-12-19 RX ORDER — LEVOTHYROXINE SODIUM 88 UG/1
88 TABLET ORAL DAILY
Qty: 90 TABLET | Refills: 0 | Status: SHIPPED | OUTPATIENT
Start: 2024-12-19

## 2024-12-19 NOTE — TELEPHONE ENCOUNTER
Pt called and said she had some questions about blood work and would like someone to call and go over the results with her. Please advise.

## 2024-12-31 ENCOUNTER — APPOINTMENT (OUTPATIENT)
Dept: OBSTETRICS AND GYNECOLOGY | Facility: CLINIC | Age: 59
End: 2024-12-31
Payer: COMMERCIAL

## 2024-12-31 VITALS
DIASTOLIC BLOOD PRESSURE: 68 MMHG | WEIGHT: 112 LBS | SYSTOLIC BLOOD PRESSURE: 110 MMHG | HEIGHT: 64 IN | BODY MASS INDEX: 19.12 KG/M2

## 2024-12-31 DIAGNOSIS — N95.8 GENITOURINARY SYNDROME OF MENOPAUSE: ICD-10-CM

## 2024-12-31 DIAGNOSIS — Z01.419 ENCOUNTER FOR GYNECOLOGICAL EXAMINATION WITHOUT ABNORMAL FINDING: Primary | ICD-10-CM

## 2024-12-31 PROCEDURE — 3008F BODY MASS INDEX DOCD: CPT | Performed by: OBSTETRICS & GYNECOLOGY

## 2024-12-31 PROCEDURE — 99396 PREV VISIT EST AGE 40-64: CPT | Performed by: OBSTETRICS & GYNECOLOGY

## 2024-12-31 RX ORDER — ASPIRIN 325 MG
1 TABLET, DELAYED RELEASE (ENTERIC COATED) ORAL
COMMUNITY
Start: 2024-12-19

## 2024-12-31 RX ORDER — ESTRADIOL 0.1 MG/G
0.5 CREAM VAGINAL 3 TIMES WEEKLY
Qty: 42.5 G | Refills: 3 | Status: SHIPPED | OUTPATIENT
Start: 2025-01-01 | End: 2026-01-01

## 2024-12-31 ASSESSMENT — PATIENT HEALTH QUESTIONNAIRE - PHQ9
1. LITTLE INTEREST OR PLEASURE IN DOING THINGS: NOT AT ALL
2. FEELING DOWN, DEPRESSED OR HOPELESS: NOT AT ALL
SUM OF ALL RESPONSES TO PHQ9 QUESTIONS 1 & 2: 0

## 2024-12-31 ASSESSMENT — PAIN SCALES - GENERAL: PAINLEVEL_OUTOF10: 0-NO PAIN

## 2024-12-31 NOTE — PROGRESS NOTES
Subjective   Alesia Arias is a 59 y.o. female here for a routine exam.  There is no postmenopausal bleeding or discharge.  No dysuria.    She has colitis, no significant change in bowel habits.  She is current on her colonoscopy.  She does mention that she will intermittently have a sudden resumption of her vertigo which unfortunately happened earlier today.    She is , no change in partner.  We did discuss that there is discomfort with intercourse which precludes being sexually active.    Personal health questionnaire reviewed: yes.     Gynecologic History  No LMP recorded. Patient is postmenopausal.  Contraception: post menopausal status  Last Pap: 23. Results were: normal  Last mammogram: 23. Results were: normal    Obstetric History  OB History    Para Term  AB Living   3 3 3 0 0 3   SAB IAB Ectopic Multiple Live Births   0 0 0 0 3      # Outcome Date GA Lbr Jl/2nd Weight Sex Type Anes PTL Lv   3 Term            2 Term            1 Term                Objective   Constitutional: Alert and in no acute distress. Well developed, well nourished.   Head and Face: Head and face: Normal.    Eyes: Normal external exam - nonicteric sclera, extraocular movements intact (EOMI) and no ptosis.   Neck: No neck asymmetry. Supple. Thyroid not enlarged and there were no palpable thyroid nodules.    Pulmonary: No respiratory distress.   Chest: Breasts: Normal appearance, no nipple discharge and no skin changes. Palpation of breasts and axillae: No palpable mass and no axillary lymphadenopathy.   Abdomen: Soft nontender; no abdominal mass palpated. No organomegaly. No hernias.   Genitourinary: External genitalia: Normal. No inguinal lymphadenopathy. Bartholin's Urethral and Skenes Glands: Normal. Urethra: Normal.  Bladder: Normal on palpation. Vagina: Normal. Cervix: Normal.  Uterus: Normal.  Right Adnexa/parametria: Normal.  Left Adnexa/parametria: Normal.  Inspection of Perianal Area: Normal.    Musculoskeletal: No joint swelling seen, normal movements of all extremities.   Skin: Normal skin color and pigmentation, normal skin turgor, and no rash.   Neurologic: Non-focal. Grossly intact.   Psychiatric: Alert and oriented x 3. Affect normal to patient baseline. Mood: Appropriate.  Physical Exam     Assessment/Plan   Healthy female exam.  This is a 59-year-old female with a normal exam.  No Pap smear was sent, she is HPV negative in 2023.    Her routine mammogram was ordered with tomosynthesis.    We readdressed the genitourinary syndrome of menopause causing pain with intercourse.  We reviewed the safety of estradiol cream.  I recommend using a pea-sized amount at the vaginal opening every night for 2 weeks initially, then 3 times a week thereafter.  It could take 8 to 12 weeks for the full effect.  If the cream and over-the-counter lubricants are insufficient, we could refer for pelvic floor physical therapy as well.    She will reach out to her PCP for further management of her vertigo.    I will see her routinely in 1 year.  Education reviewed: self breast exams.  Mammogram ordered.

## 2025-03-17 DIAGNOSIS — E78.5 HYPERLIPIDEMIA, UNSPECIFIED HYPERLIPIDEMIA TYPE: ICD-10-CM

## 2025-03-17 RX ORDER — ROSUVASTATIN CALCIUM 20 MG/1
TABLET, COATED ORAL
Qty: 90 TABLET | Refills: 1 | Status: SHIPPED | OUTPATIENT
Start: 2025-03-17

## 2025-04-04 DIAGNOSIS — E03.9 HYPOTHYROIDISM, UNSPECIFIED TYPE: ICD-10-CM

## 2025-04-04 RX ORDER — LEVOTHYROXINE SODIUM 88 UG/1
88 TABLET ORAL DAILY
Qty: 90 TABLET | Refills: 0 | Status: SHIPPED | OUTPATIENT
Start: 2025-04-04

## 2025-04-15 PROBLEM — I82.409 ACUTE DVT (DEEP VENOUS THROMBOSIS) (MULTI): Status: ACTIVE | Noted: 2025-04-15

## 2025-04-15 PROBLEM — R20.3 HYPERESTHESIA: Status: ACTIVE | Noted: 2025-04-15

## 2025-04-15 PROBLEM — B02.9 HERPES ZOSTER: Status: ACTIVE | Noted: 2025-04-15

## 2025-04-15 PROBLEM — F41.9 ANXIETY DISORDER: Status: ACTIVE | Noted: 2025-04-15

## 2025-04-15 PROBLEM — R10.9 ABDOMINAL PAIN: Status: ACTIVE | Noted: 2025-04-15

## 2025-04-15 PROBLEM — E55.9 VITAMIN D DEFICIENCY: Status: ACTIVE | Noted: 2025-04-15

## 2025-04-15 PROBLEM — E04.1 THYROID NODULE: Status: ACTIVE | Noted: 2025-04-15

## 2025-04-15 PROBLEM — R04.0 EPISTAXIS: Status: ACTIVE | Noted: 2025-04-15

## 2025-04-15 PROBLEM — R42 VERTIGO: Status: ACTIVE | Noted: 2025-04-15

## 2025-04-15 PROBLEM — E05.90 HYPERTHYROIDISM: Status: ACTIVE | Noted: 2025-04-15

## 2025-04-15 PROBLEM — R59.0 CERVICAL LYMPHADENOPATHY: Status: ACTIVE | Noted: 2025-04-15

## 2025-04-15 PROBLEM — F40.243 FEAR OF FLYING: Status: ACTIVE | Noted: 2025-04-15

## 2025-04-15 PROBLEM — R10.2 PELVIC PAIN: Status: ACTIVE | Noted: 2025-04-15

## 2025-04-15 PROBLEM — M79.604 PAIN IN BOTH LOWER EXTREMITIES: Status: ACTIVE | Noted: 2025-04-15

## 2025-04-15 PROBLEM — D72.819 LEUKOPENIA: Status: ACTIVE | Noted: 2025-04-15

## 2025-04-15 PROBLEM — E07.9 DISORDER OF THYROID: Status: ACTIVE | Noted: 2025-04-15

## 2025-04-15 PROBLEM — R42 DIZZINESS: Status: ACTIVE | Noted: 2025-04-15

## 2025-04-15 PROBLEM — R82.90 ABNORMAL URINE FINDINGS: Status: ACTIVE | Noted: 2025-04-15

## 2025-04-15 PROBLEM — M79.605 PAIN IN BOTH LOWER EXTREMITIES: Status: ACTIVE | Noted: 2025-04-15

## 2025-04-15 PROBLEM — R07.89 CHEST DISCOMFORT: Status: ACTIVE | Noted: 2025-04-15

## 2025-04-15 PROBLEM — E03.9 HYPOTHYROIDISM: Status: ACTIVE | Noted: 2025-04-15

## 2025-04-15 PROBLEM — R71.8 ELEVATED MCV: Status: ACTIVE | Noted: 2025-04-15

## 2025-04-15 PROBLEM — R00.2 PALPITATIONS: Status: ACTIVE | Noted: 2025-04-15

## 2025-04-15 PROBLEM — R22.0 PALATE MASS: Status: ACTIVE | Noted: 2025-04-15

## 2025-04-15 PROBLEM — R79.9 ABNORMAL BLOOD CHEMISTRY: Status: ACTIVE | Noted: 2025-04-15

## 2025-04-15 PROBLEM — R21 RASH: Status: ACTIVE | Noted: 2025-04-15

## 2025-04-15 PROBLEM — R19.09 INGUINAL BULGE: Status: ACTIVE | Noted: 2025-04-15

## 2025-04-15 PROBLEM — R60.9 EDEMA: Status: ACTIVE | Noted: 2025-04-15

## 2025-04-15 PROBLEM — R49.0 HOARSENESS: Status: ACTIVE | Noted: 2025-04-15

## 2025-04-15 PROBLEM — M54.2 NECK PAIN: Status: ACTIVE | Noted: 2025-04-15

## 2025-04-15 PROBLEM — R53.83 FATIGUE: Status: ACTIVE | Noted: 2025-04-15

## 2025-04-15 PROBLEM — E78.2 MIXED HYPERLIPIDEMIA: Status: ACTIVE | Noted: 2025-04-15

## 2025-04-15 PROBLEM — E78.41 ELEVATED LIPOPROTEIN A LEVEL: Status: ACTIVE | Noted: 2025-04-15

## 2025-04-15 PROBLEM — R19.7 DIARRHEA: Status: ACTIVE | Noted: 2025-04-15

## 2025-04-15 PROBLEM — K52.9 COLITIS: Status: ACTIVE | Noted: 2025-04-15

## 2025-04-15 PROBLEM — N95.2 VAGINAL ATROPHY: Status: ACTIVE | Noted: 2025-04-15

## 2025-04-16 ENCOUNTER — APPOINTMENT (OUTPATIENT)
Dept: PRIMARY CARE | Facility: CLINIC | Age: 60
End: 2025-04-16
Payer: COMMERCIAL

## 2025-06-27 ENCOUNTER — TELEPHONE (OUTPATIENT)
Dept: PRIMARY CARE | Facility: CLINIC | Age: 60
End: 2025-06-27

## 2025-06-27 ENCOUNTER — TELEMEDICINE (OUTPATIENT)
Dept: PRIMARY CARE | Facility: CLINIC | Age: 60
End: 2025-06-27
Payer: COMMERCIAL

## 2025-06-27 DIAGNOSIS — L08.9 SKIN INFECTION: Primary | ICD-10-CM

## 2025-06-27 PROCEDURE — 99213 OFFICE O/P EST LOW 20 MIN: CPT | Performed by: INTERNAL MEDICINE

## 2025-06-27 RX ORDER — CEPHALEXIN 500 MG/1
500 CAPSULE ORAL 2 TIMES DAILY
Qty: 14 CAPSULE | Refills: 0 | Status: SHIPPED | OUTPATIENT
Start: 2025-06-27 | End: 2025-07-04

## 2025-06-27 NOTE — PROGRESS NOTES
Subjective   Patient ID: Alesia Arias is a 60 y.o. female who presents for No chief complaint on file..    HPI    I performed this visit using real-time telehealth tools, including an audio/video connection between Alesia Arias and myself Dr Wilson in office [facility], [place], [place]  Patient on with me on a virtual visit  Rash painful sensitive to touch, ithcing on the edges .   On questioning, looks shiny but no discharge  No pain in the back corresponding     Review of Systems  General: see hpi  Ears, Nose, Throat : see hpi  Dermatologic: Negative for new skin conditions, rash  Respiratory: see hpi  Cardiovascular: Negative for chest pain, palpitations, or leg swelling  Gastrointestinal: Negative for nausea/vomiting, abdominal pain, changes in bowel habits  Neurological: Negative for dizziness see hpi headaches    Previous history  Medical History[1]  Surgical History[2]  Social History[3]  Family History[4]  Allergies[5]  Current Outpatient Medications   Medication Instructions   • B complex-vitamin C-folic acid (Nephro-Josselyn Rx) 1- mg-mg-mcg tablet 1 tablet, Daily with breakfast   • cholecalciferol (Vitamin D-3) 50,000 unit capsule 1 capsule, Every 7 days   • estradiol (ESTRACE) 0.5 g, vaginal, 3 times weekly, Begin with a pea-sized amount at the vaginal opening every night for 2 weeks, then 3 times weekly thereafter.   • rosuvastatin (Crestor) 20 mg tablet TAKE 1 TABLET DAILY (OFFICE VISIT DUE MARCH 4, 2025)   • Synthroid 88 mcg, oral, Daily       Objective       Physical Exam    via [employer]  General: Well groomed, well nourished , speaks full sentences  Alert Cooperative , no apparent distress   Skin: Good color does not appear dehydrated  erythematous lesion right lower outer edge breast no bleeding   Eyes: Extra ocular muscle movements intact, anicteric sclerae  Neck: Supple, with good range of motion looking behind her and moving head sideways to cough    Neurological: Alert, oriented      Assessment/Plan   Alesia Arias is a 60 y.o. female who presents for the concerns below:    Problem List Items Addressed This Visit    None  Visit Diagnoses         Skin infection    -  Primary        Does not quite follow shingles presentation, pt denies pain hypersensitivity in the adjoining skin across and she did have shingles in the past and this one does not have the adjoining pain up until the back and stopping at midline   Will start iwht cephalexin , I bob lsee her on Monday   She will call          Discussed with:   Return in :    Portions of this note were generated using digital voice recognition software, and may contain grammatical errors       Adam Romo MD  06/27/25  2:43 PM           [1]  Past Medical History:  Diagnosis Date   • Abnormal finding of blood chemistry, unspecified 11/03/2021    Abnormal blood chemistry   • Anxiety disorder, unspecified 11/09/2018    Anxiety disorder   • Asymptomatic varicose veins of unspecified lower extremity 06/16/2021    Varicose veins   • Chronic rhinitis 04/01/2019    Rhinitis   • Diarrhea, unspecified 03/15/2022    Diarrhea   • Disorder of thyroid, unspecified     Thyroid trouble   • Dizziness and giddiness 05/09/2019    Dizziness   • Dysphonia 08/28/2018    Hoarseness   • Edema, unspecified 06/16/2021    Edema   • Elevated lipoprotein(a) 02/17/2022    Elevated lipoprotein A level   • Encounter for general adult medical examination without abnormal findings 12/01/2020    Encounter for preventive health examination   • Encounter for gynecological examination (general) (routine) without abnormal findings 11/07/2020    Visit for gynecologic examination   • Encounter for immunization 11/02/2021    Flu vaccine need   • Encounter for screening for malignant neoplasm of colon 06/16/2021    Colon cancer screening   • 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   • Hyperesthesia 11/07/2017    Hyperesthesia   •  Noninfective gastroenteritis and colitis, unspecified 04/01/2019    Colitis   • Nontoxic single thyroid nodule 06/16/2021    Thyroid nodule   • Other abnormality of red blood cells 11/03/2021    Elevated MCV   • Other chest pain 11/02/2021    Chest discomfort   • Other conditions influencing health status 07/01/2013    Lump Or Swelling In The Neck   • Other intra-abdominal and pelvic swelling, mass and lump 11/07/2017    Inguinal bulge   • Pain in right leg 06/16/2021    Pain in both lower extremities   • Palpitations 11/02/2021    Palpitations   • Postnasal drip 08/28/2018    Post-nasal drip   • Rash and other nonspecific skin eruption 11/07/2017    Rash   • Unspecified abdominal pain 10/25/2016    Abdominal pain   • Unspecified symptoms and signs involving the genitourinary system 08/10/2022    Urinary symptom or sign   • Zoster without complications 11/07/2017    Shingles   [2]  Past Surgical History:  Procedure Laterality Date   • OTHER SURGICAL HISTORY  05/28/2019    No history of surgery   [3]  Social History  Tobacco Use   • Smoking status: Never   • Smokeless tobacco: Never   Vaping Use   • Vaping status: Never Used   Substance Use Topics   • Alcohol use: Yes   • Drug use: Never   [4]  Family History  Problem Relation Name Age of Onset   • No Known Problems Daughter     • No Known Problems Son     • No Known Problems Son     [5]  No Known Allergies

## 2025-06-27 NOTE — TELEPHONE ENCOUNTER
Pt thinks she has shingles and wants to do a virtual today. Can I add her at the end of the day today?

## 2025-06-30 ENCOUNTER — APPOINTMENT (OUTPATIENT)
Dept: PRIMARY CARE | Facility: CLINIC | Age: 60
End: 2025-06-30
Payer: COMMERCIAL

## 2025-06-30 VITALS — BODY MASS INDEX: 19.74 KG/M2 | DIASTOLIC BLOOD PRESSURE: 82 MMHG | WEIGHT: 115 LBS | SYSTOLIC BLOOD PRESSURE: 126 MMHG

## 2025-06-30 DIAGNOSIS — Z13.0 SCREENING FOR ENDOCRINE/METABOLIC/IMMUNITY DISORDERS: ICD-10-CM

## 2025-06-30 DIAGNOSIS — Z13.29 SCREENING FOR ENDOCRINE/METABOLIC/IMMUNITY DISORDERS: ICD-10-CM

## 2025-06-30 DIAGNOSIS — Z13.6 SCREENING FOR CARDIOVASCULAR CONDITION: ICD-10-CM

## 2025-06-30 DIAGNOSIS — B37.9 MONILIASIS: Primary | ICD-10-CM

## 2025-06-30 DIAGNOSIS — Z13.228 SCREENING FOR ENDOCRINE/METABOLIC/IMMUNITY DISORDERS: ICD-10-CM

## 2025-06-30 DIAGNOSIS — Z13.0 SCREENING FOR DEFICIENCY ANEMIA: ICD-10-CM

## 2025-06-30 DIAGNOSIS — Z13.0 SCREENING FOR ENDOCRINE, METABOLIC AND IMMUNITY DISORDER: ICD-10-CM

## 2025-06-30 DIAGNOSIS — Z13.89 SCREENING FOR BLOOD OR PROTEIN IN URINE: ICD-10-CM

## 2025-06-30 DIAGNOSIS — Z13.29 SCREENING FOR ENDOCRINE, METABOLIC AND IMMUNITY DISORDER: ICD-10-CM

## 2025-06-30 DIAGNOSIS — E55.9 VITAMIN D DEFICIENCY: ICD-10-CM

## 2025-06-30 DIAGNOSIS — Z00.00 HEALTHCARE MAINTENANCE: ICD-10-CM

## 2025-06-30 DIAGNOSIS — Z13.228 SCREENING FOR ENDOCRINE, METABOLIC AND IMMUNITY DISORDER: ICD-10-CM

## 2025-06-30 PROCEDURE — 99213 OFFICE O/P EST LOW 20 MIN: CPT | Performed by: INTERNAL MEDICINE

## 2025-06-30 PROCEDURE — 1036F TOBACCO NON-USER: CPT | Performed by: INTERNAL MEDICINE

## 2025-06-30 RX ORDER — CLOTRIMAZOLE AND BETAMETHASONE DIPROPIONATE 10; .64 MG/G; MG/G
1 CREAM TOPICAL 2 TIMES DAILY
Qty: 15 G | Refills: 0 | Status: SHIPPED | OUTPATIENT
Start: 2025-06-30 | End: 2025-07-10

## 2025-06-30 NOTE — PROGRESS NOTES
Subjective   Patient ID: Alesia Arias is a 60 y.o. female who presents for Rash (Started Thursday as small pink rash, Friday it was bigger and bright red, today is peeling and feels like leather and looks brownish, has been using cortisone. Is smaller today. ).    HPI  Patient in for a visit  We had telemedicine on Friday, started her on antbx which is effective  Has been running, even in the hot weather.  Now with some red spots   Review of Systems  General: Denies fever, chills, night sweats,  Eyes: Negative for recent visual changes  Ears, Nose, Throat :  Negative for hearing changes, sinus discomfort  Dermatologic: see hpi  Respiratory: Negative for wheezing, shortness of breath, cough  Cardiovascular: Negative for chest pain, palpitations, or leg swelling  Gastrointestinal: Negative for nausea/vomiting, abdominal pain, changes in bowel habits  Genitourinary Negative for Urinary Incontinence  urgency , frequency, discomfort   Musculoskeletal: see hpi  Neurological: Negative for headaches, dizziness    Previous history  Medical History[1]  Surgical History[2]  Social History[3]  Family History[4]  Allergies[5]  Current Outpatient Medications   Medication Instructions    B complex-vitamin C-folic acid (Nephro-Josselyn Rx) 1- mg-mg-mcg tablet 1 tablet, Daily with breakfast    cephalexin (KEFLEX) 500 mg, oral, 2 times daily    cholecalciferol (Vitamin D-3) 50,000 unit capsule 1 capsule, Every 7 days    estradiol (ESTRACE) 0.5 g, vaginal, 3 times weekly, Begin with a pea-sized amount at the vaginal opening every night for 2 weeks, then 3 times weekly thereafter.    rosuvastatin (Crestor) 20 mg tablet TAKE 1 TABLET DAILY (OFFICE VISIT DUE MARCH 4, 2025)    Synthroid 88 mcg, oral, Daily       Objective       Physical Exam  Vital Signs: as recorded above  General: Well groomed, well nourished   Orientation:  Alert , oriented to time, place , and person   Mood and Affect:  Cooperative , no apparent distress normal  affect  Skin: Good color, good turgor except anterior lower border of the right breast , areas darker flesh color some spots drying then few erythematous papules non tender no discharge no discharge   Eyes: Extra ocular muscle movements intact, anicteric sclerae  Breast no masses right breast no lesions except for above   Neck: Supple, full range of movement  BACK:  no CTLS spine tenderness, no flank tenderness  Extremities: full range of movement  bilateral UE and bilateral LE,  no lower extremity edema  Neurological: Alert, oriented, cranial nerves II-XII grossly intact except for visual acuity  Sensation:  Intact no increased hyperaesthesia following the dermatome   Gait: normal steady      Assessment/Plan   Alesia Arias is a 60 y.o. female who presents for the concerns below:    Problem List Items Addressed This Visit    None  Bacterial infected skin lesion in process of healing, finish antibiotic keep skin dry , avoid friction , she has applied  a soft smooth nonstick material in between skin and bra ,      Moniliasis - secondary yeast , avoid moisture  she has continue to run , avoid friction, use cotton material . Apply lotrisone      Discussed with:   Return in : Dec     Portions of this note were generated using digital voice recognition software, and may contain grammatical errors       Adam Romo MD  06/30/25  10:30 AM       [1]   Past Medical History:  Diagnosis Date    Abnormal finding of blood chemistry, unspecified 11/03/2021    Abnormal blood chemistry    Anxiety disorder, unspecified 11/09/2018    Anxiety disorder    Asymptomatic varicose veins of unspecified lower extremity 06/16/2021    Varicose veins    Chronic rhinitis 04/01/2019    Rhinitis    Diarrhea, unspecified 03/15/2022    Diarrhea    Disorder of thyroid, unspecified     Thyroid trouble    Dizziness and giddiness 05/09/2019    Dizziness    Dysphonia 08/28/2018    Hoarseness    Edema, unspecified 06/16/2021    Edema     Elevated lipoprotein(a) 02/17/2022    Elevated lipoprotein A level    Encounter for general adult medical examination without abnormal findings 12/01/2020    Encounter for preventive health examination    Encounter for gynecological examination (general) (routine) without abnormal findings 11/07/2020    Visit for gynecologic examination    Encounter for immunization 11/02/2021    Flu vaccine need    Encounter for screening for malignant neoplasm of colon 06/16/2021    Colon cancer screening    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    Hyperesthesia 11/07/2017    Hyperesthesia    Noninfective gastroenteritis and colitis, unspecified 04/01/2019    Colitis    Nontoxic single thyroid nodule 06/16/2021    Thyroid nodule    Other abnormality of red blood cells 11/03/2021    Elevated MCV    Other chest pain 11/02/2021    Chest discomfort    Other conditions influencing health status 07/01/2013    Lump Or Swelling In The Neck    Other intra-abdominal and pelvic swelling, mass and lump 11/07/2017    Inguinal bulge    Pain in right leg 06/16/2021    Pain in both lower extremities    Palpitations 11/02/2021    Palpitations    Postnasal drip 08/28/2018    Post-nasal drip    Rash and other nonspecific skin eruption 11/07/2017    Rash    Unspecified abdominal pain 10/25/2016    Abdominal pain    Unspecified symptoms and signs involving the genitourinary system 08/10/2022    Urinary symptom or sign    Zoster without complications 11/07/2017    Shingles   [2]   Past Surgical History:  Procedure Laterality Date    OTHER SURGICAL HISTORY  05/28/2019    No history of surgery   [3]   Social History  Tobacco Use    Smoking status: Never     Passive exposure: Never    Smokeless tobacco: Never   Vaping Use    Vaping status: Never Used   Substance Use Topics    Alcohol use: Yes    Drug use: Never   [4]   Family History  Problem Relation Name Age of Onset    No Known Problems Daughter      No Known Problems Son      No Known  Problems Son     [5] No Known Allergies

## 2025-07-25 DIAGNOSIS — E03.9 HYPOTHYROIDISM, UNSPECIFIED TYPE: ICD-10-CM

## 2025-07-28 RX ORDER — LEVOTHYROXINE SODIUM 88 UG/1
88 TABLET ORAL DAILY
Qty: 90 TABLET | Refills: 0 | Status: SHIPPED | OUTPATIENT
Start: 2025-07-28

## 2025-08-06 DIAGNOSIS — Z12.31 ENCOUNTER FOR SCREENING MAMMOGRAM FOR BREAST CANCER: ICD-10-CM

## 2026-01-09 ENCOUNTER — APPOINTMENT (OUTPATIENT)
Facility: CLINIC | Age: 61
End: 2026-01-09
Payer: COMMERCIAL